# Patient Record
Sex: MALE | Race: BLACK OR AFRICAN AMERICAN | URBAN - METROPOLITAN AREA
[De-identification: names, ages, dates, MRNs, and addresses within clinical notes are randomized per-mention and may not be internally consistent; named-entity substitution may affect disease eponyms.]

---

## 2023-04-27 ENCOUNTER — OFFICE VISIT (OUTPATIENT)
Dept: HEMATOLOGY | Age: 53
End: 2023-04-27
Payer: COMMERCIAL

## 2023-04-27 VITALS
HEART RATE: 84 BPM | SYSTOLIC BLOOD PRESSURE: 153 MMHG | WEIGHT: 255 LBS | OXYGEN SATURATION: 98 % | TEMPERATURE: 96.6 F | HEIGHT: 71 IN | BODY MASS INDEX: 35.7 KG/M2 | DIASTOLIC BLOOD PRESSURE: 95 MMHG

## 2023-04-27 DIAGNOSIS — B18.2 CHRONIC HEPATITIS C WITH CIRRHOSIS (HCC): Primary | ICD-10-CM

## 2023-04-27 DIAGNOSIS — K70.30 ALCOHOLIC CIRRHOSIS OF LIVER WITHOUT ASCITES (HCC): ICD-10-CM

## 2023-04-27 DIAGNOSIS — K74.60 CHRONIC HEPATITIS C WITH CIRRHOSIS (HCC): Primary | ICD-10-CM

## 2023-04-27 PROBLEM — I10 HYPERTENSION: Status: ACTIVE | Noted: 2023-04-27

## 2023-04-27 PROBLEM — A04.8 HELICOBACTER PYLORI INFECTION: Status: ACTIVE | Noted: 2023-04-27

## 2023-04-27 PROBLEM — K31.89 OTHER DISEASES OF STOMACH AND DUODENUM: Status: ACTIVE | Noted: 2023-02-14

## 2023-04-27 PROBLEM — R77.2 ELEVATED AFP: Status: ACTIVE | Noted: 2023-04-27

## 2023-04-27 PROBLEM — E11.9 DIABETES MELLITUS (HCC): Status: ACTIVE | Noted: 2023-04-27

## 2023-04-27 PROBLEM — K76.82 HEPATIC ENCEPHALOPATHY (HCC): Status: ACTIVE | Noted: 2023-04-27

## 2023-04-27 PROCEDURE — 91200 LIVER ELASTOGRAPHY: CPT | Performed by: NURSE PRACTITIONER

## 2023-04-27 PROCEDURE — 99203 OFFICE O/P NEW LOW 30 MIN: CPT | Performed by: NURSE PRACTITIONER

## 2023-04-27 PROCEDURE — 3080F DIAST BP >= 90 MM HG: CPT | Performed by: NURSE PRACTITIONER

## 2023-04-27 PROCEDURE — 3077F SYST BP >= 140 MM HG: CPT | Performed by: NURSE PRACTITIONER

## 2023-04-27 RX ORDER — FOLIC ACID 1 MG/1
TABLET ORAL
COMMUNITY
Start: 2023-02-22

## 2023-04-27 RX ORDER — FAMOTIDINE 20 MG/1
1 TABLET, FILM COATED ORAL 2 TIMES DAILY
COMMUNITY
Start: 2023-02-16

## 2023-04-27 RX ORDER — DICYCLOMINE HYDROCHLORIDE 20 MG/1
TABLET ORAL
COMMUNITY
Start: 2023-01-19

## 2023-04-27 RX ORDER — SERTRALINE HYDROCHLORIDE 25 MG/1
TABLET, FILM COATED ORAL
COMMUNITY
Start: 2023-02-22

## 2023-04-27 RX ORDER — OMEPRAZOLE 20 MG/1
CAPSULE, DELAYED RELEASE ORAL
COMMUNITY
Start: 2023-02-18

## 2023-04-27 RX ORDER — HYDROXYZINE 25 MG/1
TABLET, FILM COATED ORAL
COMMUNITY
Start: 2023-01-25

## 2023-04-27 RX ORDER — LACTULOSE 10 G/15ML
10 SOLUTION ORAL; RECTAL 3 TIMES DAILY
COMMUNITY

## 2023-04-27 RX ORDER — BISMUTH SUBCITRATE POTASSIUM, METRONIDAZOLE AND TETRACYCLINE HYDROCHLORIDE 140; 125; 125 MG/1; MG/1; MG/1
CAPSULE ORAL
COMMUNITY
Start: 2023-02-16

## 2023-04-27 RX ORDER — LANOLIN ALCOHOL/MO/W.PET/CERES
CREAM (GRAM) TOPICAL
COMMUNITY
Start: 2023-01-25

## 2023-04-27 RX ORDER — METFORMIN HYDROCHLORIDE 1000 MG/1
TABLET ORAL
COMMUNITY
Start: 2023-02-22

## 2023-04-27 NOTE — PROGRESS NOTES
[facility], MD, FACP, [address], [place]      Argelia Mcdonald, PA-C    Monica Junior, [facility]-BC   Nieves Choudhary, [facility]-   Kia Mars, FNP-C  Kiah Myles, FNP-C   Anna Teague, AGPCNP-BC      [address]   at [address], [facility] [place]iv Abdi  22.   [phone]   FAX: [address]   at [facility]   [address], [address]   [place], [address] [place] [address] Street - [address]   [phone]   FAX: [phone]           Patient Care Team:  None as PCP - Michelle Avila MD (Gastroenterology)      Problem List  Never Reviewed            Codes Class Noted    Chronic hepatitis C with cirrhosis ([facility].) ICD-10-CM: B18.2, K74.60  ICD-9-CM: 070.54, 571.5  4/27/2023        Diabetes mellitus ([facility].) ICD-10-CM: E11.9  ICD-9-CM: 250.00  4/27/2023        Elevated AFP ICD-10-CM: R77.2  ICD-9-CM: 790.99  4/27/2023        Hepatic encephalopathy ([facility].) ICD-10-CM: L67.22  ICD-9-CM: 572.2  4/27/2023        Hypertension ICD-10-CM: I10  ICD-9-CM: 401.9  2/22/2655        Helicobacter pylori infection ICD-10-CM: A04.8  ICD-9-CM: 041.86  0/35/6459        Alcoholic cirrhosis of liver without ascites ([facility].) ICD-10-CM: K70.30  ICD-9-CM: 571.2  4/27/2023        Other diseases of stomach and duodenum ICD-10-CM: K31.89  ICD-9-CM: 537.89  2/14/2023           The physicians listed above have Rema Boyle in consultation regarding possible cirrhosis presumed secondary to chronic HCV and alcohol-related liver disease and to perform assessment of fibrosis by means of in-office fibroscan analysis. The patient is a 47 y.o. Black male who was found to have liver disease several years ago with hospitalization in Feb 2023 with presumed HE.     In the office today the patient has the following symptoms:  The patient feels well and has no complaints. intermittent pain in the right side over the liver,   problems concentrating,   swelling of the abdomen,   hematochezia. The patient is not experiencing the following symptoms which are commonly seen with this liver disorder:   swelling of the lower extremities,   hematemesis. The patient completes all daily activities without any functional limitations. 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 Assessment of fibrosis was made through performance of fibroscan in the office today. The results of the analysis were shared with the patient in the office at the time of the procedure. The result was 12.3 kPa which correlates with stage 3 fibrosis. The CAP score of 231 suggests hepatic steatosis. A copy of the report was provided to the patient and will be forwarded to the referring medical practice. All questions were answered. The patient expressed a clear understanding of the above. ALLERGIES:  Not on File    MEDICATIONS:  Current Outpatient Medications   Medication Sig    bismuth subcit V-snqpumtor-vco 140-125-125 mg per capsule TAKE 3 CAPSULES BY MOUTH FOUR TIMES DAILY FOR 10 DAYS AS DIRECTED    dicyclomine (BENTYL) 20 mg tablet TAKE 1 TABLET BY MOUTH FOUR TIMES DAILY AS NEEDED FOR ABDOMINAL CRAMPING    famotidine (PEPCID) 20 mg tablet Take 1 Tablet by mouth two (2) times a day.     folic acid (FOLVITE) 1 mg tablet TAKE 1 TABLET BY MOUTH DAILY FOR ALCOHOL USE    hydrOXYzine HCL (ATARAX) 25 mg tablet TAKE 1 TABLET BY MOUTH THREE TIMES DAILY AS NEEDED FOR ANXIETY    metFORMIN (GLUCOPHAGE) 1,000 mg tablet TAKE 1 TABLET BY MOUTH TWICE DAILY WITH MEALS FOR DIABETES    omeprazole (PRILOSEC) 20 mg capsule TAKE ONE CAPSULE BY MOUTH TWICE DAILY FOR 10 DAYS WHILE TAKING ANTIBIOTICS    sertraline (ZOLOFT) 25 mg tablet TAKE 1 TABLET BY MOUTH DAILY FOR ANXIETY    thiamine HCL (B-1) 100 mg tablet TAKE 1 TABLET BY MOUTH DAILY FOR ALCOHOL USE    lactulose (CHRONULAC) 10 gram/15 mL solution Take 10 mL by mouth three (3) times daily. No current facility-administered medications for this visit. SYSTEM REVIEW NOT RELATED TO LIVER DISEASE OR REVIEWED ABOVE:  Constitution systems: Negative for fever, chills, weight gain, weight loss. Eyes: Negative for visual changes. ENT: Negative for sore throat, painful swallowing. Respiratory: Negative for cough, hemoptysis, SOB. Cardiology: Negative for chest pain, palpitations. GI:  Negative for constipation or diarrhea. : Negative for urinary frequency, dysuria, hematuria, nocturia. Skin: Negative for rash. Hematology: Negative for easy bruising, blood clots. Musculo-skelatal: Negative for back pain, muscle pain, weakness. Neurologic: Negative for headaches, dizziness, vertigo, memory problems not related to HE. Psychology: Negative for anxiety, depression. FAMILY HISTORY:  The patient has no knowledge of the father's medical condition. The mother is alive and healthy. There is no family history of liver disease. SOCIAL HISTORY:  The patient is . The patient has 5 children, one of whom still lives at home. The patient stopped using tobacco products in 2/2023. The patient has been abstinent from alcohol since 2/2023. 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 PHYSICAL EXAMINATION:  Visit Vitals  BP (!) 153/95 (BP 1 Location: Left upper arm, BP Patient Position: Sitting, BP Cuff Size: Adult)   Pulse 84   Temp (!) 96.6 °F (35.9 °C) (Temporal)   Ht 5' 11\" (1.803 m)   Wt 255 lb (115.7 kg)   SpO2 98%   BMI 35.57 kg/m²     General: No acute distress. Skin: No spider angiomata. No jaundice. No palmar erythema. Abdomen: Soft non-tender. Liver size normal to percussion/palpation. Spleen not palpable. No obvious ascites. Extremities: No edema. No muscle wasting.   No gross arthritic changes. Neurologic: Alert and oriented. Cranial nerves grossly intact. No asterixis. LABORATORY STUDIES:  From 2/2023  AST/ALT/ALP/T Bili/ALB: 68/76/97/0.4/4.3  WBC/HB/PLT/INR: 4.4/14.4/127/1  NA/BUN/CREAT:141/11/0.91    AFP 29.4    SEROLOGIES:  2/2023. HAV total negative,   anti-HBcore negative,   anti-HBsurface negative,   anti-HCV positive,   HCV Geneotype 1a,   HCV RNA Log10 6.917 IU/ml. LIVER HISTOLOGY:  4/2023. Fib[facility]Scan [facility] at [facility]. EkPa was 12.3. IQR/med 18%. . The results suggested a fibrosis level of F3. The CAP score suggests there is no significant hepatic steatosis. ENDOSCOPIC PROCEDURES:  Not available or performed    RADIOLOGY:  Not available or performed    OTHER TESTING:  Not available or performed    FOLLOW-UP:  All of the issues listed above in the Assessment and Plan were discussed with the patient. All questions were answered. The patient expressed a clear understanding of the above. The patient will follow-up with the referring physician.     Simi Borjas, VIDAL-AG  [facility] of [address] [address] [address], [address] [facility].  [phone]  [address] [place] [address]

## 2023-04-27 NOTE — PROGRESS NOTES
Identified pt with two pt identifiers(name and ). Reviewed record in preparation for visit and have obtained necessary documentation. Chief Complaint   Patient presents with    Hepatitis C     FS only      Vitals:    23 1016   BP: (!) 153/95   Pulse: 84   Temp: (!) 96.6 °F (35.9 °C)   TempSrc: Temporal   SpO2: 98%   Weight: 255 lb (115.7 kg)   Height: 5' 11\" (1.803 m)   PainSc:   8   PainLoc: Abdomen       Health Maintenance Review: Patient reminded of \"due or due soon\" health maintenance. I have asked the patient to contact his/her primary care provider (PCP) for follow-up on his/her health maintenance. Coordination of Care Questionnaire:  :   1) Have you been to an emergency room, urgent care, or hospitalized since your last visit? If yes, where when, and reason for visit? no       2. Have seen or consulted any other health care provider since your last visit? If yes, where when, and reason for visit? NO      Patient is accompanied by wife I have received verbal consent from Nadja Keenan to discuss any/all medical information while they are present in the room.

## 2025-08-18 ENCOUNTER — OFFICE VISIT (OUTPATIENT)
Age: 55
End: 2025-08-18
Payer: COMMERCIAL

## 2025-08-18 ENCOUNTER — TELEPHONE (OUTPATIENT)
Age: 55
End: 2025-08-18

## 2025-08-18 VITALS
BODY MASS INDEX: 35.98 KG/M2 | DIASTOLIC BLOOD PRESSURE: 116 MMHG | RESPIRATION RATE: 16 BRPM | WEIGHT: 257 LBS | OXYGEN SATURATION: 98 % | HEIGHT: 71 IN | SYSTOLIC BLOOD PRESSURE: 194 MMHG | HEART RATE: 75 BPM | TEMPERATURE: 98.9 F

## 2025-08-18 DIAGNOSIS — R10.31 RIGHT LOWER QUADRANT ABDOMINAL PAIN: Primary | ICD-10-CM

## 2025-08-18 PROCEDURE — 3080F DIAST BP >= 90 MM HG: CPT | Performed by: SURGERY

## 2025-08-18 PROCEDURE — 99203 OFFICE O/P NEW LOW 30 MIN: CPT | Performed by: SURGERY

## 2025-08-18 PROCEDURE — 3077F SYST BP >= 140 MM HG: CPT | Performed by: SURGERY

## 2025-08-18 RX ORDER — DAPAGLIFLOZIN 10 MG/1
TABLET, FILM COATED ORAL
COMMUNITY
Start: 2025-06-17

## 2025-08-18 RX ORDER — LOSARTAN POTASSIUM 25 MG/1
TABLET ORAL
COMMUNITY
Start: 2025-07-16

## 2025-08-18 RX ORDER — METHOCARBAMOL 500 MG/1
TABLET, FILM COATED ORAL
COMMUNITY
Start: 2025-08-04

## 2025-08-18 RX ORDER — KETOROLAC TROMETHAMINE 10 MG/1
10 TABLET, FILM COATED ORAL 2 TIMES DAILY
COMMUNITY
Start: 2025-08-04

## 2025-08-21 PROBLEM — R10.31 RIGHT LOWER QUADRANT ABDOMINAL PAIN: Status: ACTIVE | Noted: 2025-08-21

## 2025-08-22 ENCOUNTER — TELEPHONE (OUTPATIENT)
Age: 55
End: 2025-08-22